# Patient Record
Sex: MALE | URBAN - METROPOLITAN AREA
[De-identification: names, ages, dates, MRNs, and addresses within clinical notes are randomized per-mention and may not be internally consistent; named-entity substitution may affect disease eponyms.]

---

## 2019-04-11 ENCOUNTER — OUTPATIENT (OUTPATIENT)
Dept: OUTPATIENT SERVICES | Facility: HOSPITAL | Age: 54
LOS: 1 days | Discharge: HOME | End: 2019-04-11

## 2019-04-11 DIAGNOSIS — D50.9 IRON DEFICIENCY ANEMIA, UNSPECIFIED: ICD-10-CM

## 2020-12-02 ENCOUNTER — TRANSCRIPTION ENCOUNTER (OUTPATIENT)
Age: 55
End: 2020-12-02

## 2020-12-02 ENCOUNTER — APPOINTMENT (OUTPATIENT)
Dept: PHYSICAL MEDICINE AND REHAB | Facility: CLINIC | Age: 55
End: 2020-12-02
Payer: COMMERCIAL

## 2020-12-02 VITALS
HEIGHT: 69 IN | WEIGHT: 165 LBS | TEMPERATURE: 97.9 F | RESPIRATION RATE: 17 BRPM | SYSTOLIC BLOOD PRESSURE: 132 MMHG | HEART RATE: 70 BPM | DIASTOLIC BLOOD PRESSURE: 89 MMHG | BODY MASS INDEX: 24.44 KG/M2

## 2020-12-02 VITALS
BODY MASS INDEX: 24.44 KG/M2 | WEIGHT: 165 LBS | DIASTOLIC BLOOD PRESSURE: 89 MMHG | HEART RATE: 70 BPM | RESPIRATION RATE: 17 BRPM | SYSTOLIC BLOOD PRESSURE: 132 MMHG | TEMPERATURE: 97.9 F | HEIGHT: 69 IN

## 2020-12-02 DIAGNOSIS — Z80.0 FAMILY HISTORY OF MALIGNANT NEOPLASM OF DIGESTIVE ORGANS: ICD-10-CM

## 2020-12-02 DIAGNOSIS — Z82.61 FAMILY HISTORY OF ARTHRITIS: ICD-10-CM

## 2020-12-02 DIAGNOSIS — R56.9 UNSPECIFIED CONVULSIONS: ICD-10-CM

## 2020-12-02 DIAGNOSIS — M25.542 PAIN IN JOINTS OF LEFT HAND: ICD-10-CM

## 2020-12-02 DIAGNOSIS — Z86.39 PERSONAL HISTORY OF OTHER ENDOCRINE, NUTRITIONAL AND METABOLIC DISEASE: ICD-10-CM

## 2020-12-02 DIAGNOSIS — Z86.79 PERSONAL HISTORY OF OTHER DISEASES OF THE CIRCULATORY SYSTEM: ICD-10-CM

## 2020-12-02 DIAGNOSIS — M79.642 PAIN IN LEFT HAND: ICD-10-CM

## 2020-12-02 DIAGNOSIS — M25.442 EFFUSION, LEFT HAND: ICD-10-CM

## 2020-12-02 PROBLEM — Z00.00 ENCOUNTER FOR PREVENTIVE HEALTH EXAMINATION: Status: ACTIVE | Noted: 2020-12-02

## 2020-12-02 PROCEDURE — 99072 ADDL SUPL MATRL&STAF TM PHE: CPT

## 2020-12-02 PROCEDURE — 99204 OFFICE O/P NEW MOD 45 MIN: CPT

## 2020-12-02 RX ORDER — METHYLPREDNISOLONE 4 MG/1
4 TABLET ORAL
Qty: 1 | Refills: 1 | Status: ACTIVE | COMMUNITY
Start: 2020-12-02 | End: 1900-01-01

## 2020-12-02 NOTE — PHYSICAL EXAM
[FreeTextEntry1] : PHYSICAL EXAM : OBJECTIVE \par \par GENERAL : Awake ,alert and oriented to time place and person \par HEAD : normocephalic and atraumatic \par NECK : supple ,no tracheal deviation ,no thyroid enlargement noted with swallowing\par EYES : sclera and conjunctiva normal no redness,intact extraocular movements \par ENT  : ears and nose normal in appearance -hearing adequate \par PULMONARY: effort normal. No respiratory distress. breathing regular. No wheezes \par LYMPH : No swelling in limbs, capillary return within normal range \par CVS : warm extremities,no palpitations,not short of breath, no visible jugular venous distention\par PSYCH : mood and affect normal ,good eye contact ,normal attention \par ABDOMEN : no visible distension , \par NEUROLOGICAL:cranial nerves intact,muscle tone normal,gait and balance safe except where noted below \par SKIN : warm and dry No rash detected over specific body areas examined \par MUSCULOSKELETAL: normal muscle bulk, no focal bony tenderness /posture normal except where specified below\par left MCP tender \par ROM ok \par can open hand \par no nodule s wrist or elbow \par no evidence infection

## 2020-12-02 NOTE — ASSESSMENT
[FreeTextEntry1] : \par PLAN AND RECOMMENDATIONS :\par \par We discussed differential diagnosis and clinical impression\par rule out RA \par needs symptomatic care now -very painful \par Recommend\par .symptomatic care and support wrist brace \par  medications medrol dose av \par  imaging xrays hand \par  referral to rheum Dr Ashton \par  hydrotherapy /heat / cold for pain\par  continue\par  relative rest and avoidance of painful activity where possible \par  increasing activity as discussed \par  return for follow up 2 weeks \par labs defer to rheum \par

## 2020-12-02 NOTE — REVIEW OF SYSTEMS
[Patient Intake Form Reviewed] : Patient intake form was reviewed [Fever] : no fever [Chills] : no chills [Fatigue] : no fatigue [Recent Change In Weight] : ~T no recent weight change [Lower Ext Edema] : no lower extremity edema [Shortness Of Breath] : no shortness of breath [Negative] : Heme/Lymph

## 2020-12-02 NOTE — HISTORY OF PRESENT ILLNESS
[FreeTextEntry1] : Mr. MAYCO JEROME is a very pleasant 55 year male who seen for evaluation of left MCP swelling   that has been ongoing for 6 months  without any specific injury or inciting event. The pain is located primarily 4 th MCP which is tender swollen but not hot not red  intermittent in nature and described as sharp -today very severe cannot move his hand or open close it  . The pain is rated as 7/10 during today's visit, and ranges from 3-10/10. The patient's symptoms are aggravated by use  and alleviated by rest ice not helping . The patient denies any night pain, numbness/tingling, weakness, or bowel/bladder dysfunction. The patient has no other complaints at this time.\par his mother had juvenile RA \par he is R handed \par no other joints hurt \par

## 2020-12-03 ENCOUNTER — OUTPATIENT (OUTPATIENT)
Dept: OUTPATIENT SERVICES | Facility: HOSPITAL | Age: 55
LOS: 1 days | End: 2020-12-03
Payer: COMMERCIAL

## 2020-12-03 ENCOUNTER — APPOINTMENT (OUTPATIENT)
Dept: RADIOLOGY | Facility: CLINIC | Age: 55
End: 2020-12-03

## 2020-12-04 PROCEDURE — 73130 X-RAY EXAM OF HAND: CPT | Mod: 26,LT
